# Patient Record
Sex: FEMALE | Race: WHITE | Employment: UNEMPLOYED | URBAN - METROPOLITAN AREA
[De-identification: names, ages, dates, MRNs, and addresses within clinical notes are randomized per-mention and may not be internally consistent; named-entity substitution may affect disease eponyms.]

---
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
## 2025-04-19 ENCOUNTER — APPOINTMENT (OUTPATIENT)
Dept: GENERAL RADIOLOGY | Age: 25
End: 2025-04-19
Payer: COMMERCIAL

## 2025-04-19 ENCOUNTER — APPOINTMENT (OUTPATIENT)
Dept: CT IMAGING | Age: 25
End: 2025-04-19
Payer: COMMERCIAL

## 2025-04-19 ENCOUNTER — HOSPITAL ENCOUNTER (EMERGENCY)
Age: 25
Discharge: HOME | End: 2025-04-19
Attending: EMERGENCY MEDICINE
Payer: COMMERCIAL

## 2025-04-19 VITALS
TEMPERATURE: 102.2 F | HEIGHT: 65 IN | OXYGEN SATURATION: 100 % | DIASTOLIC BLOOD PRESSURE: 91 MMHG | BODY MASS INDEX: 39.15 KG/M2 | WEIGHT: 235 LBS | HEART RATE: 100 BPM | RESPIRATION RATE: 16 BRPM | SYSTOLIC BLOOD PRESSURE: 156 MMHG

## 2025-04-19 DIAGNOSIS — J10.1 INFLUENZA B: Primary | ICD-10-CM

## 2025-04-19 LAB
ALBUMIN SERPL-MCNC: 4.6 G/DL (ref 3.5–5.2)
ALP SERPL-CCNC: 48 U/L (ref 35–104)
ALT SERPL-CCNC: 16 U/L (ref 0–32)
ANION GAP SERPL CALCULATED.3IONS-SCNC: 14 MMOL/L (ref 7–16)
AST SERPL-CCNC: 15 U/L (ref 0–31)
BACTERIA URNS QL MICRO: ABNORMAL
BASOPHILS # BLD: 0.03 K/UL (ref 0–0.2)
BASOPHILS NFR BLD: 0 % (ref 0–2)
BILIRUB SERPL-MCNC: 0.3 MG/DL (ref 0–1.2)
BILIRUB UR QL STRIP: NEGATIVE
BUN SERPL-MCNC: 10 MG/DL (ref 6–20)
CALCIUM SERPL-MCNC: 9.3 MG/DL (ref 8.6–10.2)
CHLORIDE SERPL-SCNC: 103 MMOL/L (ref 98–107)
CLARITY UR: CLEAR
CO2 SERPL-SCNC: 20 MMOL/L (ref 22–29)
COLOR UR: YELLOW
CREAT SERPL-MCNC: 0.8 MG/DL (ref 0.5–1)
EOSINOPHIL # BLD: 0.04 K/UL (ref 0.05–0.5)
EOSINOPHILS RELATIVE PERCENT: 1 % (ref 0–6)
ERYTHROCYTE [DISTWIDTH] IN BLOOD BY AUTOMATED COUNT: 12.4 % (ref 11.5–15)
GFR, ESTIMATED: >90 ML/MIN/1.73M2
GLUCOSE SERPL-MCNC: 80 MG/DL (ref 74–99)
GLUCOSE UR STRIP-MCNC: NEGATIVE MG/DL
HCG, URINE, POC: NEGATIVE
HCT VFR BLD AUTO: 34.9 % (ref 34–48)
HGB BLD-MCNC: 12.2 G/DL (ref 11.5–15.5)
HGB UR QL STRIP.AUTO: ABNORMAL
IMM GRANULOCYTES # BLD AUTO: 0.06 K/UL (ref 0–0.58)
IMM GRANULOCYTES NFR BLD: 1 % (ref 0–5)
INFLUENZA A BY PCR: NOT DETECTED
INFLUENZA B BY PCR: DETECTED
KETONES UR STRIP-MCNC: NEGATIVE MG/DL
LEUKOCYTE ESTERASE UR QL STRIP: NEGATIVE
LYMPHOCYTES NFR BLD: 0.7 K/UL (ref 1.5–4)
LYMPHOCYTES RELATIVE PERCENT: 9 % (ref 20–42)
Lab: NORMAL
MCH RBC QN AUTO: 30.3 PG (ref 26–35)
MCHC RBC AUTO-ENTMCNC: 35 G/DL (ref 32–34.5)
MCV RBC AUTO: 86.8 FL (ref 80–99.9)
MONOCYTES NFR BLD: 0.62 K/UL (ref 0.1–0.95)
MONOCYTES NFR BLD: 8 % (ref 2–12)
NEGATIVE QC PASS/FAIL: NORMAL
NEUTROPHILS NFR BLD: 81 % (ref 43–80)
NEUTS SEG NFR BLD: 6.17 K/UL (ref 1.8–7.3)
NITRITE UR QL STRIP: NEGATIVE
PH UR STRIP: 6 [PH] (ref 5–8)
PLATELET # BLD AUTO: 257 K/UL (ref 130–450)
PMV BLD AUTO: 9.4 FL (ref 7–12)
POSITIVE QC PASS/FAIL: NORMAL
POTASSIUM SERPL-SCNC: 3.6 MMOL/L (ref 3.5–5)
PROT SERPL-MCNC: 7.7 G/DL (ref 6.4–8.3)
PROT UR STRIP-MCNC: NEGATIVE MG/DL
RBC # BLD AUTO: 4.02 M/UL (ref 3.5–5.5)
RBC #/AREA URNS HPF: ABNORMAL /HPF
SARS-COV-2 RDRP RESP QL NAA+PROBE: NOT DETECTED
SODIUM SERPL-SCNC: 137 MMOL/L (ref 132–146)
SP GR UR STRIP: 1.02 (ref 1–1.03)
SPECIMEN DESCRIPTION: NORMAL
UROBILINOGEN UR STRIP-ACNC: 0.2 EU/DL (ref 0–1)
WBC #/AREA URNS HPF: ABNORMAL /HPF
WBC OTHER # BLD: 7.6 K/UL (ref 4.5–11.5)

## 2025-04-19 PROCEDURE — 81001 URINALYSIS AUTO W/SCOPE: CPT

## 2025-04-19 PROCEDURE — 99284 EMERGENCY DEPT VISIT MOD MDM: CPT

## 2025-04-19 PROCEDURE — 96372 THER/PROPH/DIAG INJ SC/IM: CPT

## 2025-04-19 PROCEDURE — 87502 INFLUENZA DNA AMP PROBE: CPT

## 2025-04-19 PROCEDURE — 72125 CT NECK SPINE W/O DYE: CPT

## 2025-04-19 PROCEDURE — 87635 SARS-COV-2 COVID-19 AMP PRB: CPT

## 2025-04-19 PROCEDURE — 6360000002 HC RX W HCPCS

## 2025-04-19 PROCEDURE — 71045 X-RAY EXAM CHEST 1 VIEW: CPT

## 2025-04-19 PROCEDURE — 80053 COMPREHEN METABOLIC PANEL: CPT

## 2025-04-19 PROCEDURE — 70450 CT HEAD/BRAIN W/O DYE: CPT

## 2025-04-19 PROCEDURE — 96375 TX/PRO/DX INJ NEW DRUG ADDON: CPT

## 2025-04-19 PROCEDURE — 85025 COMPLETE CBC W/AUTO DIFF WBC: CPT

## 2025-04-19 PROCEDURE — 2580000003 HC RX 258

## 2025-04-19 PROCEDURE — 96374 THER/PROPH/DIAG INJ IV PUSH: CPT

## 2025-04-19 RX ORDER — METOCLOPRAMIDE HYDROCHLORIDE 5 MG/ML
10 INJECTION INTRAMUSCULAR; INTRAVENOUS ONCE
Status: COMPLETED | OUTPATIENT
Start: 2025-04-19 | End: 2025-04-19

## 2025-04-19 RX ORDER — 0.9 % SODIUM CHLORIDE 0.9 %
1000 INTRAVENOUS SOLUTION INTRAVENOUS ONCE
Status: COMPLETED | OUTPATIENT
Start: 2025-04-19 | End: 2025-04-19

## 2025-04-19 RX ORDER — KETOROLAC TROMETHAMINE 30 MG/ML
30 INJECTION, SOLUTION INTRAMUSCULAR; INTRAVENOUS ONCE
Status: COMPLETED | OUTPATIENT
Start: 2025-04-19 | End: 2025-04-19

## 2025-04-19 RX ORDER — DIPHENHYDRAMINE HYDROCHLORIDE 50 MG/ML
25 INJECTION, SOLUTION INTRAMUSCULAR; INTRAVENOUS ONCE
Status: COMPLETED | OUTPATIENT
Start: 2025-04-19 | End: 2025-04-19

## 2025-04-19 RX ADMIN — KETOROLAC TROMETHAMINE 30 MG: 30 INJECTION, SOLUTION INTRAMUSCULAR at 20:01

## 2025-04-19 RX ADMIN — DIPHENHYDRAMINE HYDROCHLORIDE 25 MG: 50 INJECTION INTRAMUSCULAR; INTRAVENOUS at 17:47

## 2025-04-19 RX ADMIN — METOCLOPRAMIDE 10 MG: 5 INJECTION, SOLUTION INTRAMUSCULAR; INTRAVENOUS at 17:46

## 2025-04-19 RX ADMIN — SODIUM CHLORIDE 1000 ML: 0.9 INJECTION, SOLUTION INTRAVENOUS at 17:48

## 2025-04-19 ASSESSMENT — PAIN SCALES - GENERAL: PAINLEVEL_OUTOF10: 9

## 2025-04-19 ASSESSMENT — LIFESTYLE VARIABLES
HOW MANY STANDARD DRINKS CONTAINING ALCOHOL DO YOU HAVE ON A TYPICAL DAY: PATIENT DOES NOT DRINK
HOW OFTEN DO YOU HAVE A DRINK CONTAINING ALCOHOL: NEVER

## 2025-04-19 ASSESSMENT — PAIN - FUNCTIONAL ASSESSMENT: PAIN_FUNCTIONAL_ASSESSMENT: 0-10

## 2025-04-19 NOTE — ED PROVIDER NOTES
[facility] EMERGENCY DEPARTMENT  EMERGENCY DEPARTMENT ENCOUNTER        Pt Name: Mandie Gatica  MRN: [id number]  Birthdate 2000  Date of evaluation: 4/19/2025  Provider: Vinicio Spears DO  PCP: Emmie Mclean NP-C  Note Started: 5:23 PM EDT 4/19/25    CHIEF COMPLAINT       Chief Complaint   Patient presents with    Headache     Onset last night    Neck Pain       HISTORY OF PRESENT ILLNESS: 1 or more Elements   History received from: Patient    Mandie Gatica is a 24 y.o. female who presents to the emergency department with chief complaint of headache and neck pain.  Patient states the headache was gradual in onset yesterday evening and states it starts in the occipital region and radiates anterior.  States that her neck pain is in the paraspinal region of the neck and into the shoulders and she has had intermittent subjective fevers over the night.  She took some Tylenol that did not improve her symptoms.  She otherwise denies any other conditions or ill contacts and has not had any falls or injuries.  Is alert and oriented x 4.  Has no back pain or weakness of any extremities.  Denies any chest pain, shortness of breath, abdominal pain, hematuria or dysuria, constipation or diarrhea.  Denies any IV drug use or substance abuse.    Nursing Notes were all reviewed and agreed with or any disagreements were addressed in the HPI.    REVIEW OF SYSTEMS :    Positives and Pertinent negatives as per HPI.    PAST MEDICAL HISTORY/Chronic Conditions Affecting Care    has a past medical history of Seizures (HCC).     SURGICAL HISTORY   No past surgical history on file.    CURRENTMEDICATIONS       Discharge Medication List as of 4/19/2025  8:21 PM        CONTINUE these medications which have NOT CHANGED    Details   lurasidone (LATUDA) 40 MG TABS tablet Take 40 mg by mouth Daily with supperHistorical Med      buPROPion (WELLBUTRIN SR) 150 MG extended release tablet Take 150 mg by mouth every

## 2025-04-20 NOTE — DISCHARGE INSTR - COC
Continuity of Care Form    Patient Name: Mandie Gatica   :  2000  MRN:  [id number]    Admit date:  2025  Discharge date:  ***    Code Status Order: No Order   Advance Directives:     Admitting Physician:  No admitting provider for patient encounter.  PCP: Emmie Mclean NP-C    Discharging Nurse: ***  Discharging Hospital Unit/Room#: DISPO/D02  Discharging Unit Phone Number: ***    Emergency Contact:   Extended Emergency Contact Information  Primary Emergency Contact: Pranav Gatica   [facility]  Home Phone: [phone]  Mobile Phone: [phone]  Relation:     Past Surgical History:  No past surgical history on file.    Immunization History:     There is no immunization history on file for this patient.    Active Problems:  There is no problem list on file for this patient.      Isolation/Infection:   Isolation            No Isolation           Unreconciled Outside Infections    External data in this report might not trigger clinical decision support.      Infection Onset Last Indicated Last Received Source    MRSA 22 [facility] ([facility])          Patient Infection Status        Infection Onset Added Last Indicated Last Indicated By Review Planned Expiration    Influenza 25 Influenza A+B, PCR 25 history                         Nurse Assessment:  Last Vital Signs: BP (!) 156/91   Pulse 100   Temp (!) 102.2 °F (39 °C)   Resp 16   Ht 1.651 m (5' 5\")   Wt 106.6 kg (235 lb)   SpO2 100%   BMI 39.11 kg/m²     Last documented pain score (0-10 scale): Pain Level: 9  Last Weight:   Wt Readings from Last 1 Encounters:   25 106.6 kg (235 lb)     Mental Status:  {IP PT MENTAL STATUS:}    IV Access:  { JIM IV ACCESS:083443157}    Nursing Mobility/ADLs:  Walking   {CHP DME ADLs:788737415}  Transfer  {CHP DME ADLs:049638423}  Bathing  {CHP DME ADLs:068519871}  Dressing  {CHP DME

## 2025-04-20 NOTE — DISCHARGE INSTRUCTIONS
Please call follow-up with your family doctor as soon as possible.  Return to emergency department if symptoms persist or worsen.  Drink plenty of fluids and take Tylenol or Motrin as needed for fever and pain.